# Patient Record
(demographics unavailable — no encounter records)

---

## 2025-01-07 NOTE — ASSESSMENT
[FreeTextEntry1] : will observe thyroid nodule. discussed that size of nodule is below the threshold for which fine needle aspiration cytology is generally recommended in the absence of any suspicious sonographic or clinical findings.  lengthy discussion regarding options for management. in view of labs and symptoms have recommended minimally invasive parathyroidectomy with PTH assay. risks, benefits and alternatives discussed at length.  I have discussed with the patient the anatomy of the area, the pathophysiology of the disease process and the rationale for surgery.  The attendant risks, possible complications and expected postoperative course have been discussed in detail.  I have given the patient the opportunity to ask questions, and all questions have been answered to the patient's satisfaction, and they wish to proceed with the planned procedure. to be scheduled ambulatory at [facility]. requested 24 hour urine calcium; bloods drawn. to call next week for results. will obtain disk from radiologist to review CT.

## 2025-01-07 NOTE — CONSULT LETTER
[Dear  ___] : Dear  [unfilled], [Consult Letter:] : I had the pleasure of evaluating your patient, [unfilled]. [Please see my note below.] : Please see my note below. [Consult Closing:] : Thank you very much for allowing me to participate in the care of this patient.  If you have any questions, please do not hesitate to contact me. [Sincerely,] : Sincerely, [FreeTextEntry2] : Dr. Tim Costello, Dr. Denis Burroughs [FreeTextEntry3] : Desean Amador MD, FACS System Director, Endocrine Surgery [facility] Associate  Professor of [employer] [facility] [DrPatricia  ___] : Dr. KOROMA

## 2025-01-07 NOTE — PHYSICAL EXAM
[de-identified] : 1 cm right thyroid nodule, well circumscribed and mobile [Laryngoscopy Performed] : laryngoscopy was performed, see procedure section for findings [Midline] : located in midline position [Normal] : orientation to person, place, and time: normal [de-identified] : indirect  laryngoscopy shows normal vocal cord mobility bilaterally with no lesions noted

## 2025-01-07 NOTE — HISTORY OF PRESENT ILLNESS
[de-identified] : Pt c/o elevated calcium for 1 year, bone pain and fatigue. denies kidney stones or constipation, dysphagia, hoarseness, SOB or RT exposure.  Ca 11.3, , vitamin D 28.6 sonogram: Right 1.4 cm and left 5 mm thyroid nodules CT: Right 9 mm parathyroid bone density: osteoporosis I have reviewed all old and new data and available images.

## 2025-04-22 NOTE — PHYSICAL EXAM
[de-identified] : well healed scar [Midline] : located in midline position [Normal] : orientation to person, place, and time: normal

## 2025-07-24 NOTE — PHYSICAL EXAM
[de-identified] : well healed scar [Midline] : located in midline position [Normal] : orientation to person, place, and time: normal